# Patient Record
Sex: FEMALE | Race: WHITE | NOT HISPANIC OR LATINO | URBAN - METROPOLITAN AREA
[De-identification: names, ages, dates, MRNs, and addresses within clinical notes are randomized per-mention and may not be internally consistent; named-entity substitution may affect disease eponyms.]

---

## 2017-01-17 RX ORDER — ESCITALOPRAM OXALATE 20 MG/1
TABLET ORAL
Qty: 30 TABLET | Refills: 3 | Status: SHIPPED | OUTPATIENT
Start: 2017-01-17 | End: 2017-05-17 | Stop reason: SDUPTHER

## 2017-05-17 RX ORDER — ESCITALOPRAM OXALATE 20 MG/1
TABLET ORAL
Qty: 30 TABLET | Refills: 4 | Status: SHIPPED | OUTPATIENT
Start: 2017-05-17 | End: 2017-08-24 | Stop reason: SDUPTHER

## 2017-07-17 RX ORDER — LEVETIRACETAM 500 MG/1
TABLET ORAL
Qty: 105 TABLET | Refills: 0 | Status: SHIPPED | OUTPATIENT
Start: 2017-07-17 | End: 2017-07-18 | Stop reason: SDUPTHER

## 2017-07-19 RX ORDER — LEVETIRACETAM 500 MG/1
TABLET ORAL
Qty: 105 TABLET | Refills: 0 | Status: SHIPPED | OUTPATIENT
Start: 2017-07-19 | End: 2017-08-21 | Stop reason: SDUPTHER

## 2017-08-21 RX ORDER — LEVETIRACETAM 500 MG/1
TABLET ORAL
Qty: 105 TABLET | Refills: 0 | Status: SHIPPED | OUTPATIENT
Start: 2017-08-21 | End: 2017-08-24 | Stop reason: SDUPTHER

## 2017-08-24 ENCOUNTER — OFFICE VISIT (OUTPATIENT)
Dept: NEUROLOGY | Facility: CLINIC | Age: 37
End: 2017-08-24

## 2017-08-24 VITALS
WEIGHT: 212 LBS | BODY MASS INDEX: 37.56 KG/M2 | SYSTOLIC BLOOD PRESSURE: 118 MMHG | HEIGHT: 63 IN | DIASTOLIC BLOOD PRESSURE: 76 MMHG

## 2017-08-24 DIAGNOSIS — G40.309 GENERALIZED CONVULSIVE EPILEPSY (HCC): Primary | ICD-10-CM

## 2017-08-24 DIAGNOSIS — F32.A DEPRESSION, UNSPECIFIED DEPRESSION TYPE: ICD-10-CM

## 2017-08-24 PROCEDURE — 99214 OFFICE O/P EST MOD 30 MIN: CPT | Performed by: PSYCHIATRY & NEUROLOGY

## 2017-08-24 RX ORDER — ESCITALOPRAM OXALATE 20 MG/1
20 TABLET ORAL DAILY
Qty: 30 TABLET | Refills: 11 | Status: SHIPPED | OUTPATIENT
Start: 2017-08-24 | End: 2018-08-14 | Stop reason: SDUPTHER

## 2017-08-24 RX ORDER — LEVETIRACETAM 500 MG/1
TABLET ORAL
Qty: 105 TABLET | Refills: 11 | Status: SHIPPED | OUTPATIENT
Start: 2017-08-24 | End: 2018-08-14 | Stop reason: SDUPTHER

## 2017-08-24 NOTE — PROGRESS NOTES
Subjective   Laurel Villalta is a 37 y.o. female.     History of Present Illness   Patient followed since September 2010 for epilepsy diagnosed in about 1994, with symptoms prior to diagnosis including jerks affecting either or both arms, or causing her to gasp. Myoclonus  occurred within a couple hours of rising in the morning and she had been tried on Depakote and Lamictal and finally ethosuximide. She was diagnosed in either [place] or [place] with LEONEL.  She had a GTC seizure in August 2010 and EEG at  showed rare irregular bihemispheric bifrontally predominant spike wave discharges. We changed her medication to Keppra, really liked except for weight gain, tried ZNS, not tolerated, switched to TPM, on 100 mg am and 150mg hs along with LVT 750mg am and 1000mg hs.  Today: has gained more weight, not doing well with diet or exercise. No jerks, but once in a while may briefly feel scattered, or have trouble thinking of names. Got a work promotion, so stressed and busy. Does not think she had any myoclonus while on Keppra alone at current dose. Mood up and down, appetite usually increases with depression.  Noted not clear if TPM providing any benefit and may be affecting memory. Planned decrease dose to 100 mgbid and if no weight gain and no szs, can continue to taper until off the medicine.    Today: did taper off the TPM, rare single/isolated jerk in the morning. Broke bone in foot last October 2/2 fall while running, trying to avoid surgery. Doing Weight Watchers, trying to lose weight. Mood has generally been good, although frustrated by inability to really exercise. Vision seems worse with age, but maybe more in temporal field OS.    The following portions of the patient's history were reviewed and updated as appropriate: allergies, current medications, past family history, past medical history, past social history, past surgical history and problem list.    Review of Systems   Constitutional: Negative  for fever and unexpected weight change.   Respiratory: Negative for cough and shortness of breath.    Cardiovascular: Negative for chest pain.       Objective   Physical Exam   Constitutional: She is oriented to person, place, and time. She appears well-developed and well-nourished.   HENT:   Head: Normocephalic and atraumatic.   Eyes: EOM are normal. Pupils are equal, round, and reactive to light.   Pulmonary/Chest: Effort normal.   Musculoskeletal: Normal range of motion.   Boot left foot   Neurological: She is oriented to person, place, and time. She has a normal Finger-Nose-Finger Test and a normal Heel to Shin Test. Gait normal.   Skin: Skin is warm and dry.   Psychiatric: Her speech is normal.   Nursing note and vitals reviewed.      Neurologic Exam     Mental Status   Oriented to person, place, and time.   Speech: speech is normal   Level of consciousness: alert  Knowledge: consistent with education.   Able to name object. Normal comprehension.     Cranial Nerves     CN II   Visual fields full to confrontation.     CN III, IV, VI   Pupils are equal, round, and reactive to light.  Extraocular motions are normal.     CN VII   Facial expression full, symmetric.     CN IX, X   CN IX normal.   CN X normal.   Palate: symmetric    CN XI   CN XI normal.     CN XII   CN XII normal.     Motor Exam   Muscle bulk: normal  Right arm pronator drift: absent  Left arm pronator drift: absent    Strength   Strength 5/5 except as noted.     Sensory Exam   Light touch normal.     Gait, Coordination, and Reflexes     Gait  Gait: normal (except for left boot)    Coordination   Finger to nose coordination: normal  Heel to shin coordination: normal    Tremor   Resting tremor: absent  Intention tremor: absent  Action tremor: absent      Assessment/Plan   Laurel was seen today for generalized convulsive epilepsy.    Diagnoses and all orders for this visit:    Generalized convulsive epilepsy    Depression, unspecified depression  type    Other orders  -     levETIRAcetam (KEPPRA) 500 MG tablet; TAKE ONE AND ONE-HALF TABLET BY MOUTH EVERY MORNING AND TAKE TWO TABLETS BY MOUTH EVERY NIGHT AT BEDTIME  -     escitalopram (LEXAPRO) 20 MG tablet; Take 1 tablet by mouth Daily.      Discussion/Summary:  Doing really well with LVT alone at 750/1000mg. Discussed meds, prognosis.  Doing well with mood, but don't advise decrease in lexapro until able to exercise which helps her mood. At that point we can cut dose first to 10mg, then possibly taper off.  Re: vision: right disc well visualized but left less so with undilated exam. Have given name of practice to call for appt.  25 minutes face to face, 15 minutes spent in discussion as above.  Return in about 1 year (around 8/24/2018).

## 2018-08-14 ENCOUNTER — OFFICE VISIT (OUTPATIENT)
Dept: NEUROLOGY | Facility: CLINIC | Age: 38
End: 2018-08-14

## 2018-08-14 VITALS
WEIGHT: 225 LBS | HEIGHT: 63 IN | SYSTOLIC BLOOD PRESSURE: 132 MMHG | DIASTOLIC BLOOD PRESSURE: 84 MMHG | HEART RATE: 64 BPM | BODY MASS INDEX: 39.87 KG/M2 | OXYGEN SATURATION: 97 %

## 2018-08-14 DIAGNOSIS — G40.B09 NONINTRACTABLE JUVENILE MYOCLONIC EPILEPSY WITHOUT STATUS EPILEPTICUS (HCC): Primary | ICD-10-CM

## 2018-08-14 DIAGNOSIS — F41.9 ANXIETY: ICD-10-CM

## 2018-08-14 PROCEDURE — 99214 OFFICE O/P EST MOD 30 MIN: CPT | Performed by: PSYCHIATRY & NEUROLOGY

## 2018-08-14 RX ORDER — ESCITALOPRAM OXALATE 20 MG/1
40 TABLET ORAL DAILY
Qty: 180 TABLET | Refills: 3 | Status: SHIPPED | OUTPATIENT
Start: 2018-08-14 | End: 2018-09-12 | Stop reason: SDUPTHER

## 2018-08-14 RX ORDER — LEVETIRACETAM 500 MG/1
TABLET ORAL
Qty: 330 TABLET | Refills: 3 | Status: SHIPPED | OUTPATIENT
Start: 2018-08-14 | End: 2018-12-05 | Stop reason: SDUPTHER

## 2018-08-14 NOTE — PROGRESS NOTES
Subjective   Laurel Villalta is a 38 y.o. female.     Chief Complaint   Patient presents with   • epilepsy       History of Present Illness     Patient followed since 9/10 for epilepsy diagnosed in about 1994, with symptoms prior to diagnosis including jerks affecting either or both arms, or causing her to gasp. Myoclonus  occurred within a couple hours of rising in the morning and she had tried VPA and LTG and finally ETX. She was diagnosed in either [place] or [place] with LEONEL.  She had a GTC seizure 8/10 and EEG at  showed rare irregular bihemispheric bifrontally predominant spike wave discharges. We changed her medication to Keppra, really liked except for weight gain, tried ZNS, not tolerated, switched to TPM, on 100 mg am and 150mg hs along with LVT 750mg am and 1000mg hs.  Then noted had gained more weight, not doing well with diet or exercise. No jerks, but once in a while may briefly feel scattered, or have trouble thinking of names. Got a work promotion, so stressed and busy. Does not think she had any myoclonus while on Keppra alone at current dose. Mood up and down, appetite usually increases with depression.  Noted not clear if TPM providing any benefit and may be affecting memory. Planned decrease dose to 100 mgbid and if no weight gain and no szs, can continue to taper until off the medicine.  8/17: did taper off the TPM, rare single/isolated jerk in the morning. Broke bone in foot last October 2/2 fall while running, trying to avoid surgery. Doing Weight Watchers, trying to lose weight. Mood has generally been good, although frustrated by inability to really exercise. Vision seems worse with age, but maybe more in temporal field OS.  Today: did have foot surgery, knee scooter x13wks; to have hardware removed in December. Has been well, no jerks/twitches she can recall. No sz of any kind. More trouble lately remembering morning dose of meds -- has had to run back, or even go home from work.  "More anxious. Just got a new PCP, suggested increase Lexapro. Had blood work, and OK except vitamin D was low. Has gained wt while off foot.     No Known Allergies    Current Outpatient Prescriptions on File Prior to Visit   Medication Sig Dispense Refill   • [DISCONTINUED] escitalopram (LEXAPRO) 20 MG tablet Take 1 tablet by mouth Daily. 30 tablet 11   • [DISCONTINUED] levETIRAcetam (KEPPRA) 500 MG tablet TAKE ONE AND ONE-HALF TABLET BY MOUTH EVERY MORNING AND TAKE TWO TABLETS BY MOUTH EVERY NIGHT AT BEDTIME 105 tablet 11     No current facility-administered medications on file prior to visit.        Past Medical History:   Diagnosis Date   • Polycystic ovarian syndrome        History reviewed. No pertinent surgical history.    Social History     Social History   • Marital status: Single     Spouse name: N/A   • Number of children: N/A   • Years of education: N/A     Occupational History   • Not on file.     Social History Main Topics   • Smoking status: Never Smoker   • Smokeless tobacco: Not on file   • Alcohol use No   • Drug use: Unknown   • Sexual activity: Defer     Other Topics Concern   • Not on file     Social History Narrative   • No narrative on file       Review of Systems   Constitutional: Negative for fever and unexpected weight change.   Respiratory: Negative for cough and shortness of breath.    Cardiovascular: Negative for chest pain.       Objective   Blood pressure 132/84, pulse 64, height 160 cm (63\"), weight 102 kg (225 lb), SpO2 97 %.    Physical Exam   Constitutional: She is oriented to person, place, and time. She appears well-developed and well-nourished.   HENT:   Head: Normocephalic and atraumatic.   Eyes: Pupils are equal, round, and reactive to light. EOM are normal.   Pulmonary/Chest: Effort normal.   Musculoskeletal: Normal range of motion.   Neurological: She is oriented to person, place, and time. She has a normal Finger-Nose-Finger Test and a normal Heel to Shin Test. Gait normal. "   Skin: Skin is warm and dry.   Psychiatric: Her speech is normal.   Nursing note and vitals reviewed.      Neurologic Exam     Mental Status   Oriented to person, place, and time.   Speech: speech is normal   Level of consciousness: alert  Knowledge: consistent with education.   Able to name object. Normal comprehension.     Cranial Nerves     CN II   Visual fields full to confrontation.     CN III, IV, VI   Pupils are equal, round, and reactive to light.  Extraocular motions are normal.     CN VII   Facial expression full, symmetric.     CN IX, X   CN IX normal.   CN X normal.   Palate: symmetric    CN XI   CN XI normal.     CN XII   CN XII normal.     Motor Exam   Muscle bulk: normal  Right arm pronator drift: absent  Left arm pronator drift: absent    Strength   Strength 5/5 except as noted.     Sensory Exam   Light touch normal.     Gait, Coordination, and Reflexes     Gait  Gait: normal    Coordination   Finger to nose coordination: normal  Heel to shin coordination: normal    Tremor   Resting tremor: absent  Intention tremor: absent  Action tremor: absent      Assessment/Plan     Laurel was seen today for epilepsy.    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      Discussion/Summary:  .I spent  25  minutes face to face with the patient, with 18 minutes spent  counselling:  Discussed compliance, carrying meds, will increase to 90 days script to help that. Discussed aging effecting on attention, as well as anxiety and attention/concentration. Will increase lexapro to 40mg, discussed higher than usual therapeutic range, but sx to monitor for, and time course of action. To see PCP in  a few weeks, and if increased dose not tolerated, Dr Chao may change med.  Return in about 3 months (around 11/14/2018).

## 2018-09-12 RX ORDER — ESCITALOPRAM OXALATE 20 MG/1
20 TABLET ORAL 2 TIMES DAILY
Qty: 60 TABLET | Refills: 10 | Status: SHIPPED | OUTPATIENT
Start: 2018-09-12 | End: 2018-12-05 | Stop reason: SDUPTHER

## 2018-12-05 ENCOUNTER — OFFICE VISIT (OUTPATIENT)
Dept: NEUROLOGY | Facility: CLINIC | Age: 38
End: 2018-12-05

## 2018-12-05 VITALS
BODY MASS INDEX: 39.87 KG/M2 | DIASTOLIC BLOOD PRESSURE: 95 MMHG | SYSTOLIC BLOOD PRESSURE: 135 MMHG | HEIGHT: 63 IN | WEIGHT: 225 LBS

## 2018-12-05 DIAGNOSIS — F32.89 OTHER DEPRESSION: ICD-10-CM

## 2018-12-05 DIAGNOSIS — G40.B09 NONINTRACTABLE JUVENILE MYOCLONIC EPILEPSY WITHOUT STATUS EPILEPTICUS (HCC): Primary | ICD-10-CM

## 2018-12-05 PROCEDURE — 99213 OFFICE O/P EST LOW 20 MIN: CPT | Performed by: PSYCHIATRY & NEUROLOGY

## 2018-12-05 RX ORDER — PHENOL 1.4 %
AEROSOL, SPRAY (ML) MUCOUS MEMBRANE AS NEEDED
COMMUNITY

## 2018-12-05 RX ORDER — ESCITALOPRAM OXALATE 20 MG/1
20 TABLET ORAL 2 TIMES DAILY
Qty: 180 TABLET | Refills: 1 | Status: SHIPPED | OUTPATIENT
Start: 2018-12-05 | End: 2022-03-15

## 2018-12-05 RX ORDER — LEVETIRACETAM 500 MG/1
TABLET ORAL
Qty: 360 TABLET | Refills: 3 | Status: SHIPPED | OUTPATIENT
Start: 2018-12-05

## 2018-12-05 NOTE — PROGRESS NOTES
Subjective   Laurel Villalta is a 38 y.o. female.     Chief Complaint   Patient presents with   • Seizures     FU       History of Present Illness     Patient followed since 9/10 for epilepsy diagnosed in about 1994, with symptoms prior to diagnosis including jerks affecting either or both arms, or causing her to gasp. Myoclonus  occurred within a couple hours of rising in the morning and she had tried VPA and LTG and finally ETX. She was diagnosed in either [place] or [place] with LEONEL.  She had a GTC seizure 8/10 and EEG at  showed rare irregular bihemispheric bifrontally predominant spike wave discharges. We changed her medication to Keppra, really liked except for weight gain, tried ZNS, not tolerated, switched to TPM, on 100 mg am and 150mg hs along with LVT 750mg am and 1000mg hs.  Then noted had gained more weight, not doing well with diet or exercise. No jerks, but once in a while may briefly feel scattered, or have trouble thinking of names. Got a work promotion, so stressed and busy. Does not think she had any myoclonus while on Keppra alone at current dose. Mood up and down, appetite usually increases with depression.  Noted not clear if TPM providing any benefit and may be affecting memory. Planned decrease dose to 100 mgbid and if no weight gain and no szs, can continue to taper until off the medicine.  8/17: did taper off the TPM, rare single/isolated jerk in the morning. Broke bone in foot last October 2/2 fall while running, trying to avoid surgery. Doing Weight Watchers. Mood has generally been good.   8/18: had foot surgery, knee scooter x13wks; to have hardware removed in December. Has been well, no jerks/twitches she can recall. No sz of any kind. More trouble lately remembering morning dose of meds -- has had to run back, or even go home from work. More anxious. Just got a new PCP, suggested increase Lexapro, made the increase to 40mg.     Today: no problems with higher dose of lexapro,  "and helping, far less anxious, feels good.   No twitches at all. Feels LVT works really well. Best medicine -- effectiveness and side effects.   December 19 gets hardware out of foot. Will be nonweightbearing for 3-4 weeks. Has been exercising a few times/week, won't be able to through January.     No Known Allergies    Current Outpatient Medications on File Prior to Visit   Medication Sig Dispense Refill   • ibuprofen (ADVIL,MOTRIN) 100 MG tablet As Needed.     • Melatonin 10 MG tablet As Needed.     • [DISCONTINUED] escitalopram (LEXAPRO) 20 MG tablet Take 1 tablet by mouth 2 (Two) Times a Day. 60 tablet 10   • [DISCONTINUED] levETIRAcetam (KEPPRA) 500 MG tablet TAKE ONE AND ONE-HALF TABLET BY MOUTH EVERY MORNING AND TAKE TWO TABLETS BY MOUTH EVERY NIGHT AT BEDTIME 330 tablet 3     No current facility-administered medications on file prior to visit.        Past Medical History:   Diagnosis Date   • Polycystic ovarian syndrome        No past surgical history on file.    Social History     Socioeconomic History   • Marital status: Single     Spouse name: Not on file   • Number of children: Not on file   • Years of education: Not on file   • Highest education level: Not on file   Social Needs   • Financial resource strain: Not on file   • Food insecurity - worry: Not on file   • Food insecurity - inability: Not on file   • Transportation needs - medical: Not on file   • Transportation needs - non-medical: Not on file   Occupational History   • Not on file   Tobacco Use   • Smoking status: Never Smoker   Substance and Sexual Activity   • Alcohol use: No   • Drug use: Not on file   • Sexual activity: Defer   Other Topics Concern   • Not on file   Social History Narrative   • Not on file       Review of Systems   Respiratory: Negative for cough and shortness of breath.    Neurological: Negative for seizures.       Objective   Blood pressure 135/95, height 160 cm (62.99\"), weight 102 kg (225 lb).    Physical Exam "   Constitutional: She is oriented to person, place, and time. She appears well-developed and well-nourished.   HENT:   Head: Normocephalic and atraumatic.   Eyes: EOM are normal. Pupils are equal, round, and reactive to light.   Pulmonary/Chest: Effort normal.   Neurological: She is oriented to person, place, and time. She has a normal Finger-Nose-Finger Test and a normal Heel to Shin Test. Gait normal.   Skin: Skin is warm and dry.   Psychiatric: She has a normal mood and affect. Her speech is normal and behavior is normal. Judgment and thought content normal.   Nursing note and vitals reviewed.      Neurologic Exam     Mental Status   Oriented to person, place, and time.   Speech: speech is normal   Level of consciousness: alert  Knowledge: consistent with education.   Able to name object. Normal comprehension.     Cranial Nerves     CN II   Visual fields full to confrontation.     CN III, IV, VI   Pupils are equal, round, and reactive to light.  Extraocular motions are normal.     CN VII   Facial expression full, symmetric.     CN IX, X   CN IX normal.   CN X normal.   Palate: symmetric    CN XI   CN XI normal.     CN XII   CN XII normal.     Motor Exam   Muscle bulk: normal  Right arm pronator drift: absent  Left arm pronator drift: absent    Strength   Strength 5/5 except as noted.     Gait, Coordination, and Reflexes     Gait  Gait: normal    Coordination   Finger to nose coordination: normal  Heel to shin coordination: normal    Tremor   Resting tremor: absent  Intention tremor: absent  Action tremor: absent      Assessment/Plan     Laurel was seen today for seizures.    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        Discussion/Summary:  .I spent  15  minutes face to face with the patient, with 10 minutes spent  counselling:  Doing really well with both LVT and high dose Lexapro.   Discussed prognosis for mood, and agreed we will continue current dose of Lexapro through surgery and recovery, at least until able to exercise again.   Return in about 6 months (around 6/5/2019).

## 2022-03-15 ENCOUNTER — OFFICE VISIT (OUTPATIENT)
Dept: PULMONOLOGY | Facility: CLINIC | Age: 42
End: 2022-03-15

## 2022-03-15 VITALS
OXYGEN SATURATION: 98 % | TEMPERATURE: 97.8 F | BODY MASS INDEX: 37.91 KG/M2 | SYSTOLIC BLOOD PRESSURE: 142 MMHG | WEIGHT: 206 LBS | DIASTOLIC BLOOD PRESSURE: 82 MMHG | HEIGHT: 62 IN | HEART RATE: 84 BPM | RESPIRATION RATE: 18 BRPM

## 2022-03-15 DIAGNOSIS — R06.02 SOB (SHORTNESS OF BREATH): Primary | ICD-10-CM

## 2022-03-15 PROCEDURE — 99204 OFFICE O/P NEW MOD 45 MIN: CPT | Performed by: INTERNAL MEDICINE

## 2022-03-15 RX ORDER — DESVENLAFAXINE SUCCINATE 50 MG/1
TABLET, EXTENDED RELEASE ORAL
COMMUNITY
Start: 2022-03-07

## 2022-03-15 RX ORDER — ALBUTEROL SULFATE 90 UG/1
AEROSOL, METERED RESPIRATORY (INHALATION)
COMMUNITY
Start: 2022-01-31

## 2022-03-15 RX ORDER — ERGOCALCIFEROL 1.25 MG/1
CAPSULE ORAL
COMMUNITY
Start: 2022-03-09

## 2022-03-15 RX ORDER — SUMATRIPTAN 100 MG/1
TABLET, FILM COATED ORAL
COMMUNITY
Start: 2022-02-16

## 2022-03-15 RX ORDER — DICLOFENAC SODIUM 75 MG/1
TABLET, DELAYED RELEASE ORAL
COMMUNITY
Start: 2022-02-16

## 2022-03-15 RX ORDER — LEVETIRACETAM 750 MG/1
TABLET ORAL
COMMUNITY
Start: 2022-02-01

## 2022-03-15 NOTE — PROGRESS NOTES
CC:    Shortness of breath    HPI:    Afia is a 41 y.o. WF with h/o lifetime non-smoking, seizure disorder since her teens - stable on Keppra over a decade, migraines, depression - recently switched from SSRI to SNRI ~2 months ago, likely intermittent allergic rhinitis, and GERD on prn Zantac who comes today for evaluation of TRAN.  Patient has been a runner, she ran a half-marathon last year.  More recently has noticed TRAN occurring after a short distance to the point where she would have to walk or stop after 0.1 to 0.25 mile, less endurance, and slower times overall.  No overt chest tightness or wheezing.  Does have occasional post nasal gtt and dry cough.  She does feel like HR gets faster than usual during these episodes.  No changes in voice pitch / tone or hoarseness.      She had a PFT at [facility] 2/11/22 that was normal FEV1/FVC 88%, FEV1 143%, %, %, normal RV/TLC, DLCO 85% DLCO/%, normal Raw.    PMH:    Past Medical History:   Diagnosis Date   • Polycystic ovarian syndrome      PSH:    No past surgical history on file.  FH:    Family History   Problem Relation Age of Onset   • Alcohol abuse Father    • Alzheimer's disease Other    • Cancer Other    • Dementia Other    • Diabetes Other      SH:    Social History     Socioeconomic History   • Marital status: Single   Tobacco Use   • Smoking status: Never Smoker   Substance and Sexual Activity   • Alcohol use: No   • Sexual activity: Defer     ALLERGIES:    No Known Allergies  MEDICATIONS:      Current Outpatient Medications:   •  albuterol sulfate  (90 Base) MCG/ACT inhaler, , Disp: , Rfl:   •  desvenlafaxine (PRISTIQ) 50 MG 24 hr tablet, , Disp: , Rfl:   •  diclofenac (VOLTAREN) 75 MG EC tablet, , Disp: , Rfl:   •  ibuprofen (ADVIL,MOTRIN) 100 MG tablet, As Needed., Disp: , Rfl:   •  levETIRAcetam (KEPPRA) 500 MG tablet, TAKE ONE AND ONE-HALF TABLET BY MOUTH EVERY MORNING AND TAKE TWO TABLETS BY MOUTH EVERY NIGHT AT BEDTIME, Disp: 360  tablet, Rfl: 3  •  Melatonin 10 MG tablet, As Needed., Disp: , Rfl:   •  SUMAtriptan (IMITREX) 100 MG tablet, , Disp: , Rfl:   •  vitamin D (ERGOCALCIFEROL) 1.25 MG (38112 UT) capsule capsule, , Disp: , Rfl:   •  escitalopram (LEXAPRO) 20 MG tablet, Take 1 tablet by mouth 2 (Two) Times a Day., Disp: 180 tablet, Rfl: 1  •  levETIRAcetam (KEPPRA) 750 MG tablet, , Disp: , Rfl:   ROS:  Per HPI, otherwise all systems reviewed and negative.    DIAGNOSTIC DATA (Reviewed and interpreted by me unless otherwise specified):    PFT at [facility] [place] 2/11/22 that was normal FEV1/FVC 88%, FEV1 143%, %, %, normal RV/TLC, DLCO 85% DLCO/%, normal Raw.    CXR 3/15/22 Trachea is midline.  Cardiomediastinal silhouette is within normal limits.  The lungs are clear without discreet mass, consolidation, effusion or pneumothorax.    Vitals:    03/15/22 1528   BP: 142/82   Pulse: 84   Resp: 18   Temp: 97.8 °F (36.6 °C)   SpO2: 98%       Physical Exam   Constitutional: Oriented to person, place, and time. Appears well-developed and well-nourished.   Head: Normocephalic and atraumatic.   Nose: Nose normal.   Mouth/Throat: Oropharynx is clear and moist.   Eyes: Conjunctivae are normal.  Pupils normal.  Neck: No tracheal deviation present.   Cardiovascular: Normal rate, regular rhythm, normal heart sounds and intact distal pulses.  Exam reveals no gallop and no friction rub.  No thrill.  No JVD.  No edema.  No murmur heard.  Pulmonary/Chest: Effort normal and breath sounds normal.  No tenderness to palpation.  No clubbing.   Abdominal: Soft. Bowel sounds are normal. No distension. No tenderness. There is no guarding.   Musculoskeletal: Normal range of motion.  No tenderness.  Lymphadenopathy:  No cervical adenopathy.   Neurological:  No new focal neurological deficits observed   Skin: Skin is warm and dry. No rash noted.   Psychiatric: Normal mood and affect.  Behavior is normal. Judgment normal.    Assessment/Plan     1)  Dyspnea on  Exertion  2)  Lifetime Non-Smoker    Unclear explanation for patients dyspnea, however, normal PFT / CXR / O2 sat are re-assuring.  Suspect she could have an exaggerated HRR to exercise - possibly exacerbated by SNRI vs exercise induced asthma vs exercise induced vocal cord dysfunction vs other etiologies.  I think the best test to narrow differential would be a cardiopulmonary exercise test.  Depending on results may need to adjust SNRI, add beta blocker, or order additional diagnostic testing.  She did not notice any improvement with albuterol trial.    She also complains of exertional dizziness and light headedness.  This could also potentially be related to SNRI's which can cause orthostasis but hasn't always occurred in tandem with the shortness of breath.  Still recommend we start with CPX.    RTC 3 months - however will call patient with results of CPX and plan    To Mims MD  Pulmonology and Critical Care Medicine  03/15/22 16:13 EDT  Electronically Signed    C.C.:  Jinny Sal MD, Jinny Sal MD

## 2022-03-22 ENCOUNTER — DOCUMENTATION (OUTPATIENT)
Dept: PULMONOLOGY | Facility: CLINIC | Age: 42
End: 2022-03-22

## 2022-03-22 ENCOUNTER — OFFICE VISIT (OUTPATIENT)
Dept: PULMONOLOGY | Facility: CLINIC | Age: 42
End: 2022-03-22

## 2022-03-22 DIAGNOSIS — R06.02 SHORTNESS OF BREATH: Primary | ICD-10-CM

## 2022-03-22 DIAGNOSIS — R06.02 SOB (SHORTNESS OF BREATH): ICD-10-CM

## 2022-03-22 PROCEDURE — 94621 CARDIOPULM EXERCISE TESTING: CPT | Performed by: NURSE PRACTITIONER

## 2022-03-22 NOTE — PROGRESS NOTES
Ms. Villalta was here for CPX testing today.  She did sign informed consent to proceed with testing.    Pretest vitals:    /110, HR 74, SPO2 97%, Shea 2    Physical Exam  Vitals and nursing note reviewed.   Constitutional:       Appearance: She is well-developed. She is obese. She is not diaphoretic.   HENT:      Head: Normocephalic and atraumatic.   Eyes:      Pupils: Pupils are equal, round, and reactive to light.   Neck:      Thyroid: No thyromegaly.   Cardiovascular:      Rate and Rhythm: Normal rate and regular rhythm.      Heart sounds: Normal heart sounds. No murmur heard.    No friction rub. No gallop.   Pulmonary:      Effort: Pulmonary effort is normal. No respiratory distress.      Breath sounds: Normal breath sounds. No wheezing or rales.   Chest:      Chest wall: No tenderness.   Abdominal:      General: Bowel sounds are normal.      Palpations: Abdomen is soft.      Tenderness: There is no abdominal tenderness.   Musculoskeletal:         General: No swelling. Normal range of motion.      Cervical back: Normal range of motion and neck supple.   Lymphadenopathy:      Cervical: No cervical adenopathy.   Skin:     General: Skin is warm and dry.      Capillary Refill: Capillary refill takes less than 2 seconds.   Neurological:      Mental Status: She is alert and oriented to person, place, and time.   Psychiatric:         Behavior: Behavior normal.       Posttest vitals:    /110, , SPO2 97%, Shea 5    Ms. Villalta tolerated CPX testing today.  She did have to quit testing due to severe dyspnea and the speed of incline on the treadmill.  She did not achieve RER.  She did not have any wheezing post testing.  She did give poor effort on her PFTs at the 5 and 10 minutes PFTs.  She will follow-up with Dr. Mims later this week after he has reviewed the CPX results.    NIEVES Sheikh

## 2022-03-25 RX ORDER — BUDESONIDE AND FORMOTEROL FUMARATE DIHYDRATE 160; 4.5 UG/1; UG/1
2 AEROSOL RESPIRATORY (INHALATION) 2 TIMES DAILY PRN
Qty: 1 EACH | Refills: 11 | Status: SHIPPED | OUTPATIENT
Start: 2022-03-25 | End: 2023-03-06 | Stop reason: SDUPTHER

## 2023-03-06 RX ORDER — BUDESONIDE AND FORMOTEROL FUMARATE DIHYDRATE 160; 4.5 UG/1; UG/1
2 AEROSOL RESPIRATORY (INHALATION) 2 TIMES DAILY PRN
Qty: 1 EACH | Refills: 11 | Status: SHIPPED | OUTPATIENT
Start: 2023-03-06 | End: 2023-03-06 | Stop reason: SDUPTHER

## 2023-03-06 RX ORDER — BUDESONIDE AND FORMOTEROL FUMARATE DIHYDRATE 160; 4.5 UG/1; UG/1
2 AEROSOL RESPIRATORY (INHALATION) 2 TIMES DAILY PRN
Qty: 1 EACH | Refills: 11 | Status: SHIPPED | OUTPATIENT
Start: 2023-03-06

## 2024-04-12 ENCOUNTER — TRANSCRIBE ORDERS (OUTPATIENT)
Dept: ADMINISTRATIVE | Facility: HOSPITAL | Age: 44
End: 2024-04-12
Payer: COMMERCIAL

## 2024-04-12 DIAGNOSIS — Z12.31 VISIT FOR SCREENING MAMMOGRAM: Primary | ICD-10-CM

## 2024-05-15 ENCOUNTER — APPOINTMENT (OUTPATIENT)
Dept: OTHER | Facility: HOSPITAL | Age: 44
End: 2024-05-15
Payer: COMMERCIAL

## 2024-05-15 ENCOUNTER — HOSPITAL ENCOUNTER (OUTPATIENT)
Dept: MAMMOGRAPHY | Facility: HOSPITAL | Age: 44
Discharge: HOME | End: 2024-05-15
Payer: COMMERCIAL

## 2024-05-15 DIAGNOSIS — Z12.31 VISIT FOR SCREENING MAMMOGRAM: ICD-10-CM

## 2024-05-15 PROCEDURE — 77063 BREAST TOMOSYNTHESIS BI: CPT

## 2024-05-15 PROCEDURE — 77067 SCR MAMMO BI INCL CAD: CPT

## 2025-04-17 ENCOUNTER — TRANSCRIBE ORDERS (OUTPATIENT)
Dept: ADMINISTRATIVE | Facility: HOSPITAL | Age: 45
End: 2025-04-17
Payer: COMMERCIAL

## 2025-04-17 DIAGNOSIS — Z12.31 OTHER SCREENING MAMMOGRAM: Primary | ICD-10-CM

## 2025-05-13 LAB
NCCN CRITERIA FLAG: NORMAL
TYRER CUZICK SCORE: 10.9

## 2025-05-16 ENCOUNTER — HOSPITAL ENCOUNTER (OUTPATIENT)
Facility: HOSPITAL | Age: 45
Discharge: HOME | End: 2025-05-16
Admitting: INTERNAL MEDICINE
Payer: COMMERCIAL

## 2025-05-16 DIAGNOSIS — Z12.31 OTHER SCREENING MAMMOGRAM: ICD-10-CM

## 2025-05-16 PROCEDURE — 77067 SCR MAMMO BI INCL CAD: CPT

## 2025-05-16 PROCEDURE — 77063 BREAST TOMOSYNTHESIS BI: CPT
